# Patient Record
Sex: FEMALE | Race: WHITE
[De-identification: names, ages, dates, MRNs, and addresses within clinical notes are randomized per-mention and may not be internally consistent; named-entity substitution may affect disease eponyms.]

---

## 2020-06-17 ENCOUNTER — HOSPITAL ENCOUNTER (OUTPATIENT)
Dept: HOSPITAL 19 - SDCO | Age: 25
Discharge: HOME | End: 2020-06-17
Attending: PLASTIC SURGERY
Payer: OTHER GOVERNMENT

## 2020-06-17 VITALS — TEMPERATURE: 98 F | DIASTOLIC BLOOD PRESSURE: 60 MMHG | HEART RATE: 76 BPM | SYSTOLIC BLOOD PRESSURE: 122 MMHG

## 2020-06-17 VITALS — TEMPERATURE: 98.2 F | SYSTOLIC BLOOD PRESSURE: 108 MMHG | HEART RATE: 81 BPM | DIASTOLIC BLOOD PRESSURE: 82 MMHG

## 2020-06-17 VITALS — DIASTOLIC BLOOD PRESSURE: 75 MMHG | HEART RATE: 73 BPM | SYSTOLIC BLOOD PRESSURE: 124 MMHG

## 2020-06-17 VITALS — HEART RATE: 69 BPM | SYSTOLIC BLOOD PRESSURE: 123 MMHG | DIASTOLIC BLOOD PRESSURE: 74 MMHG

## 2020-06-17 VITALS — DIASTOLIC BLOOD PRESSURE: 67 MMHG | HEART RATE: 71 BPM | SYSTOLIC BLOOD PRESSURE: 115 MMHG

## 2020-06-17 VITALS — HEART RATE: 66 BPM | SYSTOLIC BLOOD PRESSURE: 116 MMHG | DIASTOLIC BLOOD PRESSURE: 72 MMHG

## 2020-06-17 VITALS — HEIGHT: 65 IN | BODY MASS INDEX: 30.01 KG/M2 | WEIGHT: 180.12 LBS

## 2020-06-17 DIAGNOSIS — Z11.59: ICD-10-CM

## 2020-06-17 DIAGNOSIS — N62: Primary | ICD-10-CM

## 2020-06-17 NOTE — NUR
Patient continues to be drowsy at this time.  States she does not want pain
medication or ice chips at this time.  Will continue to monitor.

## 2020-06-17 NOTE — NUR
Patient states she would like to go home at this time.  IV removed, intact.
Upon getting dressed, patients  noted moderate amount of bleeding to
bandage.  MD made aware.  Asked to reinforce and monitor for further bleeding.
Will monitor.

## 2020-06-17 NOTE — NUR
Patient was brought back to [facility] bay [facility] via cart.  Placed on monitors, vital
signs stable on 2L via NC.  Report recieved from PACU [facility]cia.  All questions
answered.  Patient drowsy but states pain is 3/10 to breasts.
Denies nausea.  IV infusing without difficulty.   at bedside.  Ice
chips placed at bedside.  Ace wrap to chest clean dry intact.  Call bell
within reach.  Will continue to monitor.

## 2020-06-17 NOTE — NUR
Patient states she is feeling better with pain 3/10 to breasts.  Ambulated to
bathroom with assist.  Urinated without difficulty.  Upon return to bay,
patient became nasueated.  Small amount of emesis noted.  Medication given per
orders will monitor.

## 2020-06-17 NOTE — NUR
Patient reports increased pain to bilateral breasts of 6/10.  States she would
like some water and pudding.  Tolerating without difficulty.  Pain medication
given per orders.  Will continue to monitor.

## 2020-06-17 NOTE — NUR
Discharge instructions reviewed with patient and .  All questions
answered.  Bandages rechecked, clean dry intact, no further bleeding.

## 2020-06-17 NOTE — NUR
MD made aware that there is no further bleeding, states they can go home if
they would like.  States to provide cell phone number.  Number given to
.  Patient to get dressed at this time.